# Patient Record
Sex: FEMALE | Race: WHITE | NOT HISPANIC OR LATINO | Employment: OTHER | URBAN - METROPOLITAN AREA
[De-identification: names, ages, dates, MRNs, and addresses within clinical notes are randomized per-mention and may not be internally consistent; named-entity substitution may affect disease eponyms.]

---

## 2023-06-14 ENCOUNTER — TELEPHONE (OUTPATIENT)
Dept: PRIMARY CARE | Facility: CLINIC | Age: 78
End: 2023-06-14
Payer: MEDICARE

## 2023-06-14 NOTE — TELEPHONE ENCOUNTER
Patient calling for lab orders for up coming appt on 6/19/23 for [facility]. Patient states that they are in an assisted living that only takes them Tuesday and Thursday for appts. Patient wants to go tomorrow for the lab work.    Thank you.

## 2023-06-15 ENCOUNTER — APPOINTMENT (OUTPATIENT)
Dept: LAB | Facility: LAB | Age: 78
End: 2023-06-15
Payer: MEDICARE

## 2023-06-15 PROBLEM — R06.83 SNORING: Status: ACTIVE | Noted: 2023-06-15

## 2023-06-15 PROBLEM — E55.9 VITAMIN D DEFICIENCY: Status: ACTIVE | Noted: 2023-06-15

## 2023-06-15 PROBLEM — I10 HTN (HYPERTENSION): Status: ACTIVE | Noted: 2023-06-15

## 2023-06-15 PROBLEM — R53.83 FATIGUE: Status: ACTIVE | Noted: 2023-06-15

## 2023-06-15 PROBLEM — F22 DELUSIONAL THOUGHTS (MULTI): Status: ACTIVE | Noted: 2023-06-15

## 2023-06-15 PROBLEM — E78.5 HYPERLIPIDEMIA: Status: ACTIVE | Noted: 2023-06-15

## 2023-06-15 PROBLEM — F41.9 ANXIETY: Status: ACTIVE | Noted: 2023-06-15

## 2023-06-15 PROBLEM — M81.0 OSTEOPOROSIS, SENILE: Status: ACTIVE | Noted: 2023-06-15

## 2023-06-15 PROBLEM — F01.50 VASCULAR DEMENTIA WITHOUT BEHAVIORAL DISTURBANCE (MULTI): Status: ACTIVE | Noted: 2023-06-15

## 2023-06-15 PROBLEM — R60.0 LOWER EXTREMITY EDEMA: Status: ACTIVE | Noted: 2023-06-15

## 2023-06-15 PROBLEM — F03.90 DEMENTIA (MULTI): Status: ACTIVE | Noted: 2023-06-15

## 2023-06-15 LAB
ALANINE AMINOTRANSFERASE (SGPT) (U/L) IN SER/PLAS: 10 U/L (ref 7–45)
ALBUMIN (G/DL) IN SER/PLAS: 3.9 G/DL (ref 3.4–5)
ALKALINE PHOSPHATASE (U/L) IN SER/PLAS: 69 U/L (ref 33–136)
ANION GAP IN SER/PLAS: 10 MMOL/L (ref 10–20)
APPEARANCE, URINE: NORMAL
ASPARTATE AMINOTRANSFERASE (SGOT) (U/L) IN SER/PLAS: 15 U/L (ref 9–39)
BILIRUBIN TOTAL (MG/DL) IN SER/PLAS: 0.6 MG/DL (ref 0–1.2)
BILIRUBIN, URINE: NEGATIVE
BLOOD, URINE: NEGATIVE
CALCIDIOL (25 OH VITAMIN D3) (NG/ML) IN SER/PLAS: 38 NG/ML
CALCIUM (MG/DL) IN SER/PLAS: 9 MG/DL (ref 8.6–10.3)
CARBON DIOXIDE, TOTAL (MMOL/L) IN SER/PLAS: 27 MMOL/L (ref 21–32)
CHLORIDE (MMOL/L) IN SER/PLAS: 108 MMOL/L (ref 98–107)
CHOLESTEROL (MG/DL) IN SER/PLAS: 213 MG/DL (ref 0–199)
CHOLESTEROL IN HDL (MG/DL) IN SER/PLAS: 58.3 MG/DL
CHOLESTEROL/HDL RATIO: 3.7
COLOR, URINE: NORMAL
CREATININE (MG/DL) IN SER/PLAS: 0.72 MG/DL (ref 0.5–1.05)
ERYTHROCYTE DISTRIBUTION WIDTH (RATIO) BY AUTOMATED COUNT: 13.9 % (ref 11.5–14.5)
ERYTHROCYTE MEAN CORPUSCULAR HEMOGLOBIN CONCENTRATION (G/DL) BY AUTOMATED: 32.1 G/DL (ref 32–36)
ERYTHROCYTE MEAN CORPUSCULAR VOLUME (FL) BY AUTOMATED COUNT: 95 FL (ref 80–100)
ERYTHROCYTES (10*6/UL) IN BLOOD BY AUTOMATED COUNT: 4.6 X10E12/L (ref 4–5.2)
GFR FEMALE: 85 ML/MIN/1.73M2
GLUCOSE (MG/DL) IN SER/PLAS: 93 MG/DL (ref 74–99)
GLUCOSE, URINE: NEGATIVE MG/DL
HEMATOCRIT (%) IN BLOOD BY AUTOMATED COUNT: 43.9 % (ref 36–46)
HEMOGLOBIN (G/DL) IN BLOOD: 14.1 G/DL (ref 12–16)
KETONES, URINE: NEGATIVE MG/DL
LDL: 145 MG/DL (ref 0–99)
LEUKOCYTE ESTERASE, URINE: NEGATIVE
LEUKOCYTES (10*3/UL) IN BLOOD BY AUTOMATED COUNT: 6.6 X10E9/L (ref 4.4–11.3)
NITRITE, URINE: NEGATIVE
PH, URINE: 5 (ref 5–8)
PLATELETS (10*3/UL) IN BLOOD AUTOMATED COUNT: 263 X10E9/L (ref 150–450)
POTASSIUM (MMOL/L) IN SER/PLAS: 4.2 MMOL/L (ref 3.5–5.3)
PROTEIN TOTAL: 6.9 G/DL (ref 6.4–8.2)
PROTEIN, URINE: NEGATIVE MG/DL
SODIUM (MMOL/L) IN SER/PLAS: 141 MMOL/L (ref 136–145)
SPECIFIC GRAVITY, URINE: 1.02 (ref 1–1.03)
THYROTROPIN (MIU/L) IN SER/PLAS BY DETECTION LIMIT <= 0.05 MIU/L: 2.9 MIU/L (ref 0.44–3.98)
TRIGLYCERIDE (MG/DL) IN SER/PLAS: 51 MG/DL (ref 0–149)
UREA NITROGEN (MG/DL) IN SER/PLAS: 18 MG/DL (ref 6–23)
UROBILINOGEN, URINE: <2 MG/DL (ref 0–1.9)
VLDL: 10 MG/DL (ref 0–40)

## 2023-06-15 RX ORDER — ACETAMINOPHEN 500 MG
TABLET ORAL
COMMUNITY
Start: 2014-09-08

## 2023-06-15 RX ORDER — ARIPIPRAZOLE 2 MG/1
1 TABLET ORAL DAILY
COMMUNITY
Start: 2021-02-22 | End: 2023-06-19 | Stop reason: SDUPTHER

## 2023-06-15 RX ORDER — ACETAMINOPHEN 500 MG
1 TABLET ORAL DAILY
COMMUNITY
Start: 2021-02-22

## 2023-06-15 RX ORDER — CITALOPRAM 10 MG/1
1 TABLET ORAL DAILY
COMMUNITY
Start: 2021-02-22 | End: 2023-06-19 | Stop reason: SDUPTHER

## 2023-06-19 ENCOUNTER — LAB (OUTPATIENT)
Dept: LAB | Facility: LAB | Age: 78
End: 2023-06-19
Payer: MEDICARE

## 2023-06-19 ENCOUNTER — OFFICE VISIT (OUTPATIENT)
Dept: PRIMARY CARE | Facility: CLINIC | Age: 78
End: 2023-06-19
Payer: MEDICARE

## 2023-06-19 VITALS
BODY MASS INDEX: 35.82 KG/M2 | SYSTOLIC BLOOD PRESSURE: 131 MMHG | DIASTOLIC BLOOD PRESSURE: 68 MMHG | WEIGHT: 215 LBS | HEART RATE: 58 BPM | OXYGEN SATURATION: 96 % | HEIGHT: 65 IN

## 2023-06-19 DIAGNOSIS — R60.0 LOWER EXTREMITY EDEMA: ICD-10-CM

## 2023-06-19 DIAGNOSIS — F22 DELUSIONAL THOUGHTS (MULTI): ICD-10-CM

## 2023-06-19 DIAGNOSIS — F32.A DEPRESSION, UNSPECIFIED DEPRESSION TYPE: ICD-10-CM

## 2023-06-19 DIAGNOSIS — M81.0 OSTEOPOROSIS, SENILE: ICD-10-CM

## 2023-06-19 DIAGNOSIS — E55.9 VITAMIN D DEFICIENCY: ICD-10-CM

## 2023-06-19 DIAGNOSIS — F01.50 VASCULAR DEMENTIA WITHOUT BEHAVIORAL DISTURBANCE (MULTI): ICD-10-CM

## 2023-06-19 DIAGNOSIS — Z00.00 MEDICARE ANNUAL WELLNESS VISIT, SUBSEQUENT: ICD-10-CM

## 2023-06-19 DIAGNOSIS — Z86.73 HISTORY OF STROKE: ICD-10-CM

## 2023-06-19 DIAGNOSIS — F01.50 VASCULAR DEMENTIA WITHOUT BEHAVIORAL DISTURBANCE (MULTI): Primary | ICD-10-CM

## 2023-06-19 PROBLEM — I10 HTN (HYPERTENSION): Status: RESOLVED | Noted: 2023-06-15 | Resolved: 2023-06-19

## 2023-06-19 LAB
ALANINE AMINOTRANSFERASE (SGPT) (U/L) IN SER/PLAS: 10 U/L (ref 7–45)
ALBUMIN (G/DL) IN SER/PLAS: 3.9 G/DL (ref 3.4–5)
ALKALINE PHOSPHATASE (U/L) IN SER/PLAS: 75 U/L (ref 33–136)
ANION GAP IN SER/PLAS: 11 MMOL/L (ref 10–20)
ASPARTATE AMINOTRANSFERASE (SGOT) (U/L) IN SER/PLAS: 16 U/L (ref 9–39)
BILIRUBIN TOTAL (MG/DL) IN SER/PLAS: 0.5 MG/DL (ref 0–1.2)
CALCIUM (MG/DL) IN SER/PLAS: 8.9 MG/DL (ref 8.6–10.3)
CARBON DIOXIDE, TOTAL (MMOL/L) IN SER/PLAS: 27 MMOL/L (ref 21–32)
CHLORIDE (MMOL/L) IN SER/PLAS: 107 MMOL/L (ref 98–107)
CREATININE (MG/DL) IN SER/PLAS: 0.73 MG/DL (ref 0.5–1.05)
GFR FEMALE: 84 ML/MIN/1.73M2
GLUCOSE (MG/DL) IN SER/PLAS: 100 MG/DL (ref 74–99)
POTASSIUM (MMOL/L) IN SER/PLAS: 4.3 MMOL/L (ref 3.5–5.3)
PROTEIN TOTAL: 6.7 G/DL (ref 6.4–8.2)
SODIUM (MMOL/L) IN SER/PLAS: 141 MMOL/L (ref 136–145)
UREA NITROGEN (MG/DL) IN SER/PLAS: 20 MG/DL (ref 6–23)

## 2023-06-19 PROCEDURE — 80053 COMPREHEN METABOLIC PANEL: CPT

## 2023-06-19 PROCEDURE — G0446 INTENS BEHAVE THER CARDIO DX: HCPCS | Performed by: FAMILY MEDICINE

## 2023-06-19 PROCEDURE — 99497 ADVNCD CARE PLAN 30 MIN: CPT | Performed by: FAMILY MEDICINE

## 2023-06-19 PROCEDURE — G0439 PPPS, SUBSEQ VISIT: HCPCS | Performed by: FAMILY MEDICINE

## 2023-06-19 PROCEDURE — 1036F TOBACCO NON-USER: CPT | Performed by: FAMILY MEDICINE

## 2023-06-19 PROCEDURE — 1157F ADVNC CARE PLAN IN RCRD: CPT | Performed by: FAMILY MEDICINE

## 2023-06-19 PROCEDURE — 36415 COLL VENOUS BLD VENIPUNCTURE: CPT

## 2023-06-19 PROCEDURE — 1159F MED LIST DOCD IN RCRD: CPT | Performed by: FAMILY MEDICINE

## 2023-06-19 PROCEDURE — 99214 OFFICE O/P EST MOD 30 MIN: CPT | Performed by: FAMILY MEDICINE

## 2023-06-19 PROCEDURE — 1170F FXNL STATUS ASSESSED: CPT | Performed by: FAMILY MEDICINE

## 2023-06-19 PROCEDURE — 1160F RVW MEDS BY RX/DR IN RCRD: CPT | Performed by: FAMILY MEDICINE

## 2023-06-19 RX ORDER — ARIPIPRAZOLE 2 MG/1
2 TABLET ORAL DAILY
Qty: 90 TABLET | Refills: 2 | Status: SHIPPED | OUTPATIENT
Start: 2023-06-19 | End: 2024-01-02 | Stop reason: SDUPTHER

## 2023-06-19 RX ORDER — CITALOPRAM 10 MG/1
10 TABLET ORAL DAILY
Qty: 90 TABLET | Refills: 2 | Status: SHIPPED | OUTPATIENT
Start: 2023-06-19 | End: 2024-01-02 | Stop reason: SDUPTHER

## 2023-06-19 ASSESSMENT — ACTIVITIES OF DAILY LIVING (ADL)
MANAGING_FINANCES: TOTAL CARE
GROCERY_SHOPPING: TOTAL CARE
TAKING_MEDICATION: NEEDS ASSISTANCE
DOING_HOUSEWORK: TOTAL CARE
BATHING: NEEDS ASSISTANCE
DRESSING: NEEDS ASSISTANCE

## 2023-06-19 ASSESSMENT — PATIENT HEALTH QUESTIONNAIRE - PHQ9
1. LITTLE INTEREST OR PLEASURE IN DOING THINGS: NOT AT ALL
2. FEELING DOWN, DEPRESSED OR HOPELESS: NOT AT ALL
SUM OF ALL RESPONSES TO PHQ9 QUESTIONS 1 & 2: 0

## 2023-06-19 NOTE — PROGRESS NOTES
"General Medical Management Note    78 y.o. female presents for Medical Management.  Niece present  HPI    Living at [facility].  Walks with walker.  No falls.  Meds administered by staff. Weakness with prolonged standing.  Unable to manage finances, cook, shop.  Sleeps in a recliner to avoid \"pain\".  Takes > 30 sec to stand from sitting    Chronic low extremity edema.  Never elevates legs.    Compliant with current medications.  Notes no side effects.  Long-term anticoagulation due to history of stroke.  She denies hematochezia, epistaxis and hematemesis.  Depression and anxiety are managed with Celexa.  Mood disorder managed with Abilify.    Mental status has somewhat improved since her stroke.  She is oriented x4 (person, place, time and situation) however has difficulty with insight and judgment.      Past Medical History:   Diagnosis Date    Acute embolism and thrombosis of other specified veins     Blood clot in abdominal vein    Nontraumatic intracerebral hemorrhage, unspecified (CMS/HCC) 02/13/2020    Hemorrhagic cerebrovascular accident (CVA)    Personal history of malignant neoplasm of other parts of uterus 02/22/2021    History of malignant neoplasm of endometrium    Personal history of other diseases of the circulatory system 02/22/2021    History of hypertension    Personal history of other diseases of the circulatory system 03/20/2017    History of cerebral hemorrhage    Personal history of other diseases of the circulatory system 12/22/2016    History of cerebral parenchymal hemorrhage    Personal history of other venous thrombosis and embolism     History of DVT of lower extremity    Personal history of other venous thrombosis and embolism 08/20/2017    History of deep vein thrombosis (DVT) of lower extremity    Personal history of pulmonary embolism 08/20/2017    History of pulmonary embolism    Personal history of pulmonary embolism 02/22/2021    History of pulmonary embolism      Past " Surgical History:   Procedure Laterality Date    CT HEAD ANGIO W AND WO IV CONTRAST  8/9/2016    CT HEAD ANGIO W AND WO IV CONTRAST 8/9/[facility]    CT NECK ANGIO W AND WO IV CONTRAST  8/9/2016    CT NECK ANGIO W AND WO IV CONTRAST 8/9/2016 [facility]    OTHER SURGICAL HISTORY  02/22/2021    Total hysterectomy with removal of both tubes and ovaries    OTHER SURGICAL HISTORY  08/27/2019    Cyst excision    OTHER SURGICAL HISTORY  08/28/2019    Cyst excision     Family History   Problem Relation Name Age of Onset    Aneurysm Mother      Other (malignant neoplasm of stomach [Other]) Father      Other (malignant neoplasm of breast) Sister        Social History     Socioeconomic History    Marital status: Single     Spouse name: Not on file    Number of children: Not on file    Years of education: Not on file    Highest education level: Not on file   Occupational History    Not on file   Tobacco Use    Smoking status: Not on file    Smokeless tobacco: Not on file   Substance and Sexual Activity    Alcohol use: Not on file    Drug use: Not on file    Sexual activity: Not on file   Other Topics Concern    Not on file   Social History Narrative    Not on file     Social Determinants of Health     Financial Resource Strain: Not on file   Food Insecurity: Not on file   Transportation Needs: Not on file   Physical Activity: Not on file   Stress: Not on file   Social Connections: Not on file   Intimate Partner Violence: Not on file   Housing Stability: Not on file       Current Outpatient Medications on File Prior to Visit   Medication Sig Dispense Refill    acetaminophen (Tylenol) 500 mg tablet Take by mouth.      ARIPiprazole (Abilify) 2 mg tablet Take 1 tablet (2 mg) by mouth once daily.      cholecalciferol (Vitamin D-3) 5,000 Units tablet Take 1 tablet (5,000 Units) by mouth once daily.      citalopram (CeleXA) 10 mg tablet Take 1 tablet (10 mg) by mouth once daily.      psyllium (Metamucil) 3.4  "gram packet Take by mouth.      rivaroxaban (Xarelto) 20 mg tablet Take by mouth.       No current facility-administered medications on file prior to visit.       Allergies   Allergen Reactions    Oxycodone Unknown    Propoxyphene Hallucinations    Pseudoephedrine Hallucinations    Saccharin Unknown         ROS: Denies chest pain, SOB, Headache, GI problems     Visit Vitals  /68   Pulse 58   Ht 1.651 m (5' 5\")   Wt 97.5 kg (215 lb)   SpO2 96%   BMI 35.78 kg/m²   Smoking Status Never Assessed   BSA 2.11 m²        PHYSICAL EXAM:  Alert and oriented x3.  Eyes: EOM grossly intact.  Face expressionless.  No tremors noted.  Neck supple without lymph adenopathy or carotid bruit.  No masses or thyromegaly  Heart regular rate and rhythm without murmur.  Lungs clear to auscultation.  Legs without edema.  Gait is impaired due to stroke but patient is ambulatory with a rollator.   Speech clear.  Hearing adequate.      The 10-year ASCVD risk score (Letitia DK, et al., 2019) is: 22.8%    Values used to calculate the score:      Age: 78 years      Sex: Female      Is Non- : No      Diabetic: No      Tobacco smoker: No      Systolic Blood Pressure: 131 mmHg      Is BP treated: No      HDL Cholesterol: 58.3 mg/dL      Total Cholesterol: 213 mg/dL      DIAGNOSIS/PLAN:  1. Vascular dementia without behavioral disturbance (CMS/HCC)  Continue Xarelto 20mg daily    - Comprehensive Metabolic Panel; Future    2. Depression, unspecified depression type  - Comprehensive Metabolic Panel; Future  - citalopram (CeleXA) 10 mg tablet; Take 1 tablet (10 mg) by mouth once daily.  Dispense: 90 tablet; Refill: 2    3. Lower extremity edema  Notify me if worsening.  Recommend elevation of legs at least at night.  Patient cannot sleep in a bed.  She can only elevate legs as much as her recliner allows    4. Osteoporosis, senile  -Continue calcium and vitamin D supplement daily.  Continue ambulation as much as " possible.    5. Vitamin D deficiency  Continue daily supplement    6. Delusional thoughts (CMS/[facility])  - Comprehensive Metabolic Panel; Future  - ARIPiprazole (Abilify) 2 mg tablet; Take 1 tablet (2 mg) by mouth once daily.  Dispense: 90 tablet; Refill: 2    7. History of stroke  - rivaroxaban (Xarelto) 20 mg tablet; Take 1 tablet (20 mg) by mouth once daily in the evening. Take with meals.  Dispense: 90 tablet; Refill: 2    8. Medicare annual wellness visit, subsequent  Living Will / Advanced Care Planning:  Discussed advance care planning including explanation and discussion of advanced directives.  Patient does have current up-to-date documents.          Return to office in 6 months for comprehensive medical evaluation, long-term medication use monitoring, and preventative services screening    Will continue to monitor, evaluate, assess and treat all problems/diagnoses as appropriate and continue to collaborate with specialists.    Encouraged to sign up with  [employer]    Contact office or send a  [employer] message with any questions or concerns    Patient will only be notified of labs that require medical intervention.    Prescriptions will not be filled unless you are compliant with your follow up appointments or have a follow up appointment scheduled as per instruction of your physician. Refills should be requested at the time of your visit.    **Charting was completed using voice recognition technology and may include unintended errors**    Jay Fernandes DO, FACOFP  Senior Attending Physician  [facility]  [address]  [place] [address] [phone]           Jay Fernandes DO, FACOFP     Subjective   Reason for Visit: Amy Nava is an 78 y.o. female here for a Medicare Wellness visit.               HPI    Patient Care Team:  Jay Fernandes DO as PCP - General  Jay Fernandes DO as PCP - [facility] Medicare Advantage PCP     Review of Systems    Objective   Vitals:  BP  "131/68   Pulse 58   Ht 1.651 m (5' 5\")   Wt 97.5 kg (215 lb)   SpO2 96%   BMI 35.78 kg/m²       Physical Exam    Assessment/Plan   Problem List Items Addressed This Visit       Fatigue     Other Visit Diagnoses       Routine general medical examination at health care facility    -  Primary               "

## 2023-12-19 ENCOUNTER — LAB (OUTPATIENT)
Dept: LAB | Facility: LAB | Age: 78
End: 2023-12-19
Payer: MEDICARE

## 2023-12-19 ENCOUNTER — OFFICE VISIT (OUTPATIENT)
Dept: PRIMARY CARE | Facility: CLINIC | Age: 78
End: 2023-12-19
Payer: MEDICARE

## 2023-12-19 VITALS
DIASTOLIC BLOOD PRESSURE: 74 MMHG | WEIGHT: 220 LBS | HEIGHT: 63 IN | SYSTOLIC BLOOD PRESSURE: 141 MMHG | HEART RATE: 90 BPM | OXYGEN SATURATION: 96 % | BODY MASS INDEX: 38.98 KG/M2

## 2023-12-19 DIAGNOSIS — E78.5 HYPERLIPIDEMIA, UNSPECIFIED HYPERLIPIDEMIA TYPE: ICD-10-CM

## 2023-12-19 DIAGNOSIS — Z86.73 HISTORY OF STROKE: ICD-10-CM

## 2023-12-19 DIAGNOSIS — R60.0 LOWER EXTREMITY EDEMA: ICD-10-CM

## 2023-12-19 DIAGNOSIS — F32.A DEPRESSION, UNSPECIFIED DEPRESSION TYPE: ICD-10-CM

## 2023-12-19 DIAGNOSIS — E66.01 CLASS 2 SEVERE OBESITY DUE TO EXCESS CALORIES WITH SERIOUS COMORBIDITY AND BODY MASS INDEX (BMI) OF 38.0 TO 38.9 IN ADULT (MULTI): ICD-10-CM

## 2023-12-19 DIAGNOSIS — F01.50 VASCULAR DEMENTIA WITHOUT BEHAVIORAL DISTURBANCE (MULTI): Primary | ICD-10-CM

## 2023-12-19 PROBLEM — E66.812 CLASS 2 SEVERE OBESITY DUE TO EXCESS CALORIES WITH SERIOUS COMORBIDITY AND BODY MASS INDEX (BMI) OF 38.0 TO 38.9 IN ADULT: Status: ACTIVE | Noted: 2023-12-19

## 2023-12-19 LAB
ALBUMIN SERPL BCP-MCNC: 3.9 G/DL (ref 3.4–5)
ALP SERPL-CCNC: 72 U/L (ref 33–136)
ALT SERPL W P-5'-P-CCNC: 9 U/L (ref 7–45)
ANION GAP SERPL CALC-SCNC: 8 MMOL/L (ref 10–20)
AST SERPL W P-5'-P-CCNC: 12 U/L (ref 9–39)
BILIRUB SERPL-MCNC: 0.5 MG/DL (ref 0–1.2)
BUN SERPL-MCNC: 21 MG/DL (ref 6–23)
CALCIUM SERPL-MCNC: 9 MG/DL (ref 8.6–10.3)
CHLORIDE SERPL-SCNC: 108 MMOL/L (ref 98–107)
CO2 SERPL-SCNC: 30 MMOL/L (ref 21–32)
CREAT SERPL-MCNC: 0.79 MG/DL (ref 0.5–1.05)
GFR SERPL CREATININE-BSD FRML MDRD: 77 ML/MIN/1.73M*2
GLUCOSE SERPL-MCNC: 100 MG/DL (ref 74–99)
POTASSIUM SERPL-SCNC: 4.4 MMOL/L (ref 3.5–5.3)
PROT SERPL-MCNC: 6.5 G/DL (ref 6.4–8.2)
SODIUM SERPL-SCNC: 142 MMOL/L (ref 136–145)

## 2023-12-19 PROCEDURE — 80053 COMPREHEN METABOLIC PANEL: CPT

## 2023-12-19 PROCEDURE — 1126F AMNT PAIN NOTED NONE PRSNT: CPT | Performed by: FAMILY MEDICINE

## 2023-12-19 PROCEDURE — 1159F MED LIST DOCD IN RCRD: CPT | Performed by: FAMILY MEDICINE

## 2023-12-19 PROCEDURE — 1160F RVW MEDS BY RX/DR IN RCRD: CPT | Performed by: FAMILY MEDICINE

## 2023-12-19 PROCEDURE — 36415 COLL VENOUS BLD VENIPUNCTURE: CPT

## 2023-12-19 PROCEDURE — 1036F TOBACCO NON-USER: CPT | Performed by: FAMILY MEDICINE

## 2023-12-19 PROCEDURE — 99214 OFFICE O/P EST MOD 30 MIN: CPT | Performed by: FAMILY MEDICINE

## 2023-12-19 NOTE — PROGRESS NOTES
General Medical Management Note    78 y.o. female presents for Medical Management.  Presents with her daughter who provides most of the history.  HPI    Not exercising much;  requiring a lift chair to stand.  Walking short distances outside of ALU room with walker.  Walks within room without walker.      HLD - long history of elevated LDL but patient is not willing to take medication to control it.    Weight management: Patient has lost 12 pounds since June 2022    Depression and dementia are managed with Celexa and Abilify.  Patient apparently remains appropriate for assisted living.  No behavioral concerns by the daughter.    Past Medical History:   Diagnosis Date    Acute embolism and thrombosis of other specified veins     Blood clot in abdominal vein    HTN (hypertension) 06/15/2023    Nontraumatic intracerebral hemorrhage, unspecified (CMS/HCC) 02/13/2020    Hemorrhagic cerebrovascular accident (CVA)    Personal history of malignant neoplasm of other parts of uterus 02/22/2021    History of malignant neoplasm of endometrium    Personal history of other diseases of the circulatory system 02/22/2021    History of hypertension    Personal history of other diseases of the circulatory system 03/20/2017    History of cerebral hemorrhage    Personal history of other diseases of the circulatory system 12/22/2016    History of cerebral parenchymal hemorrhage    Personal history of other venous thrombosis and embolism     History of DVT of lower extremity    Personal history of other venous thrombosis and embolism 08/20/2017    History of deep vein thrombosis (DVT) of lower extremity    Personal history of pulmonary embolism 08/20/2017    History of pulmonary embolism    Personal history of pulmonary embolism 02/22/2021    History of pulmonary embolism      Past Surgical History:   Procedure Laterality Date    CT ANGIO NECK W AND WO IV CONTRAST  8/9/2016    CT NECK ANGIO W AND WO IV CONTRAST 8/9/2016 [facility]PATIENT  [facility]GAC[facility]    CT HEAD ANGIO W AND WO IV CONTRAST  8/9/2016    CT HEAD ANGIO W AND WO IV CONTRAST 8/9/2016 [facility] INPATIENT LEGAC[facility]    OTHER SURGICAL HISTORY  02/22/2021    Total hysterectomy with removal of both tubes and ovaries    OTHER SURGICAL HISTORY  08/27/2019    Cyst excision    OTHER SURGICAL HISTORY  08/28/2019    Cyst excision     Family History   Problem Relation Name Age of Onset    Aneurysm Mother      Other (malignant neoplasm of stomach [Other]) Father      Other (malignant neoplasm of breast) Sister        Social History     Socioeconomic History    Marital status: Single     Spouse name: Not on file    Number of children: Not on file    Years of education: Not on file    Highest education level: Not on file   Occupational History    Not on file   Tobacco Use    Smoking status: Never    Smokeless tobacco: Never   Substance and Sexual Activity    Alcohol use: Not Currently    Drug use: Not Currently    Sexual activity: Not on file   Other Topics Concern    Not on file   Social History Narrative    Not on file     Social Determinants of Health     Financial Resource Strain: Not on file   Food Insecurity: Not on file   Transportation Needs: Not on file   Physical Activity: Not on file   Stress: Not on file   Social Connections: Not on file   Intimate Partner Violence: Not on file   Housing Stability: Not on file       Current Outpatient Medications on File Prior to Visit   Medication Sig Dispense Refill    acetaminophen (Tylenol) 500 mg tablet Take by mouth.      ARIPiprazole (Abilify) 2 mg tablet Take 1 tablet (2 mg) by mouth once daily. 90 tablet 2    cholecalciferol (Vitamin D-3) 5,000 Units tablet Take 1 tablet (5,000 Units) by mouth once daily.      citalopram (CeleXA) 10 mg tablet Take 1 tablet (10 mg) by mouth once daily. 90 tablet 2    psyllium (Metamucil) 3.4 gram packet Take by mouth.      rivaroxaban (Xarelto) 20 mg tablet Take 1 tablet (20 mg) by mouth once daily in the evening. Take with meals. 90  "tablet 2     No current facility-administered medications on file prior to visit.       Allergies   Allergen Reactions    Oxycodone Unknown    Propoxyphene Hallucinations    Pseudoephedrine Hallucinations    Saccharin Unknown         ROS: Denies chest pain, SOB, Headache, GI problems     Visit Vitals  /74   Pulse 90   Ht 1.6 m (5' 3\")   Wt 99.8 kg (220 lb)   SpO2 96%   BMI 38.97 kg/m²   Smoking Status Never   BSA 2.11 m²        PHYSICAL EXAM:  Alert and oriented x3.  Eyes: EOM grossly intact  Neck supple without lymph adenopathy or carotid bruit.  No masses or thyromegaly  Heart regular rate and rhythm without murmur.  Lungs clear to auscultation.  Legs without significant edema.  Gait is slow and steady with a walker.  Speech clear.  Hearing adequate.          DIAGNOSIS/PLAN:  1. Lower extremity edema  - Comprehensive Metabolic Panel; Future    2. Hyperlipidemia, unspecified hyperlipidemia type  Discussed increased risk for heart disease and stroke with elevated LDL cholesterol.  Patient is not willing to take medication    3. Vascular dementia without behavioral disturbance (CMS/[facility])  Late effects of stroke.    4. History of stroke  Continue Xarelto.  Prescriptions will be sent to [employer] [facility] mail order after January 1, 2024    5. Depression, unspecified depression type  Continue Abilify and Celexa.  Prescriptions will be sent to [employer] [facility] mail order after January 1, 2024    6.  Obesity with BMI 38.9  Patient encouraged to commit to a diet of lower carbohydrates and increase vegetable and fruit intake. Patient also encouraged to increase water intake to 80 ounces/day. Continue exercise for at least 30 minutes a day on most days of the week.  Sustained obesity leads to increased risk for multiple medical problems including heart attack, stroke, cancer and infection.  For more assistance and weight loss options, go to the website: Yourweightmatters.org.  Additional resources:  RethinkObesity.com, obesity.org, " obesityaction.org, [employer].com        Return to office in 6 months for comprehensive medical evaluation, long-term medication use monitoring, and preventative services screening    We will continue to monitor, evaluate, assess and treat all problems/diagnoses as appropriate and continue to collaborate with specialists.    [facility] with [facility]    Contact office or send a  [employer] message with any questions or concerns    Patient will only be notified of labs that require medical intervention.    Prescriptions will not be filled unless you are compliant with your follow up appointments or have a follow up appointment scheduled as per instruction of your physician. Refills should be requested at the time of your visit.    **Charting was completed using voice recognition technology and may include unintended errors**    Jay Fernandes DO, JORGE  [id number]  [place] [address] [phone]  Jay Fernandes DO, JORGE

## 2024-01-02 DIAGNOSIS — F22 DELUSIONAL THOUGHTS (MULTI): ICD-10-CM

## 2024-01-02 DIAGNOSIS — F32.A DEPRESSION, UNSPECIFIED DEPRESSION TYPE: ICD-10-CM

## 2024-01-02 DIAGNOSIS — Z86.73 HISTORY OF STROKE: ICD-10-CM

## 2024-01-02 RX ORDER — ARIPIPRAZOLE 2 MG/1
2 TABLET ORAL DAILY
Qty: 90 TABLET | Refills: 2 | Status: SHIPPED | OUTPATIENT
Start: 2024-01-02

## 2024-01-02 RX ORDER — CITALOPRAM 10 MG/1
10 TABLET ORAL DAILY
Qty: 90 TABLET | Refills: 2 | Status: SHIPPED | OUTPATIENT
Start: 2024-01-02

## 2024-06-28 ENCOUNTER — APPOINTMENT (OUTPATIENT)
Dept: PRIMARY CARE | Facility: CLINIC | Age: 79
End: 2024-06-28
Payer: MEDICARE

## 2024-07-02 ENCOUNTER — APPOINTMENT (OUTPATIENT)
Dept: PRIMARY CARE | Facility: CLINIC | Age: 79
End: 2024-07-02
Payer: MEDICARE

## 2024-07-02 ENCOUNTER — LAB (OUTPATIENT)
Dept: LAB | Facility: LAB | Age: 79
End: 2024-07-02
Payer: MEDICARE

## 2024-07-02 VITALS
WEIGHT: 206 LBS | SYSTOLIC BLOOD PRESSURE: 108 MMHG | BODY MASS INDEX: 36.5 KG/M2 | OXYGEN SATURATION: 97 % | DIASTOLIC BLOOD PRESSURE: 66 MMHG | HEIGHT: 63 IN | HEART RATE: 92 BPM

## 2024-07-02 DIAGNOSIS — I10 ESSENTIAL (PRIMARY) HYPERTENSION: ICD-10-CM

## 2024-07-02 DIAGNOSIS — M81.0 OSTEOPOROSIS, SENILE: ICD-10-CM

## 2024-07-02 DIAGNOSIS — E78.5 HYPERLIPIDEMIA, UNSPECIFIED HYPERLIPIDEMIA TYPE: ICD-10-CM

## 2024-07-02 DIAGNOSIS — Z86.73 HISTORY OF STROKE: ICD-10-CM

## 2024-07-02 DIAGNOSIS — Z00.00 MEDICARE ANNUAL WELLNESS VISIT, SUBSEQUENT: ICD-10-CM

## 2024-07-02 DIAGNOSIS — R30.0 DYSURIA: ICD-10-CM

## 2024-07-02 DIAGNOSIS — F01.50 VASCULAR DEMENTIA WITHOUT BEHAVIORAL DISTURBANCE (MULTI): ICD-10-CM

## 2024-07-02 DIAGNOSIS — C54.1 MALIGNANT NEOPLASM OF ENDOMETRIUM (MULTI): ICD-10-CM

## 2024-07-02 DIAGNOSIS — E66.01 CLASS 2 SEVERE OBESITY DUE TO EXCESS CALORIES WITH SERIOUS COMORBIDITY AND BODY MASS INDEX (BMI) OF 38.0 TO 38.9 IN ADULT (MULTI): ICD-10-CM

## 2024-07-02 DIAGNOSIS — F32.A DEPRESSION, UNSPECIFIED DEPRESSION TYPE: ICD-10-CM

## 2024-07-02 DIAGNOSIS — F22 DELUSIONAL THOUGHTS (MULTI): ICD-10-CM

## 2024-07-02 DIAGNOSIS — E55.9 VITAMIN D DEFICIENCY: ICD-10-CM

## 2024-07-02 DIAGNOSIS — Z00.00 HEALTH MAINTENANCE EXAMINATION: Primary | ICD-10-CM

## 2024-07-02 DIAGNOSIS — E66.01 CLASS 2 SEVERE OBESITY DUE TO EXCESS CALORIES WITH SERIOUS COMORBIDITY AND BODY MASS INDEX (BMI) OF 36.0 TO 36.9 IN ADULT (MULTI): ICD-10-CM

## 2024-07-02 DIAGNOSIS — R06.02 SHORTNESS OF BREATH: ICD-10-CM

## 2024-07-02 DIAGNOSIS — R32 URINARY INCONTINENCE, UNSPECIFIED TYPE: ICD-10-CM

## 2024-07-02 PROBLEM — I61.9 NONTRAUMATIC INTRACEREBRAL HEMORRHAGE, UNSPECIFIED (MULTI): Status: RESOLVED | Noted: 2024-07-02 | Resolved: 2024-07-02

## 2024-07-02 PROBLEM — Z86.718 PERSONAL HISTORY OF OTHER VENOUS THROMBOSIS AND EMBOLISM: Status: RESOLVED | Noted: 2024-07-02 | Resolved: 2024-07-02

## 2024-07-02 LAB
25(OH)D3 SERPL-MCNC: 34 NG/ML (ref 30–100)
ALBUMIN SERPL BCP-MCNC: 3.8 G/DL (ref 3.4–5)
ALP SERPL-CCNC: 71 U/L (ref 33–136)
ALT SERPL W P-5'-P-CCNC: 8 U/L (ref 7–45)
ANION GAP SERPL CALC-SCNC: 11 MMOL/L (ref 10–20)
APPEARANCE UR: CLEAR
AST SERPL W P-5'-P-CCNC: 13 U/L (ref 9–39)
BILIRUB SERPL-MCNC: 0.8 MG/DL (ref 0–1.2)
BILIRUB UR STRIP.AUTO-MCNC: NEGATIVE MG/DL
BUN SERPL-MCNC: 16 MG/DL (ref 6–23)
CALCIUM SERPL-MCNC: 9 MG/DL (ref 8.6–10.3)
CHLORIDE SERPL-SCNC: 107 MMOL/L (ref 98–107)
CHOLEST SERPL-MCNC: 212 MG/DL (ref 0–199)
CHOLESTEROL/HDL RATIO: 3.4
CO2 SERPL-SCNC: 28 MMOL/L (ref 21–32)
COLOR UR: ABNORMAL
CREAT SERPL-MCNC: 0.75 MG/DL (ref 0.5–1.05)
EGFRCR SERPLBLD CKD-EPI 2021: 81 ML/MIN/1.73M*2
ERYTHROCYTE [DISTWIDTH] IN BLOOD BY AUTOMATED COUNT: 13.8 % (ref 11.5–14.5)
GLUCOSE SERPL-MCNC: 89 MG/DL (ref 74–99)
GLUCOSE UR STRIP.AUTO-MCNC: NORMAL MG/DL
HCT VFR BLD AUTO: 44.3 % (ref 36–46)
HDLC SERPL-MCNC: 62 MG/DL
HGB BLD-MCNC: 14.3 G/DL (ref 12–16)
KETONES UR STRIP.AUTO-MCNC: NEGATIVE MG/DL
LDLC SERPL CALC-MCNC: 138 MG/DL
LEUKOCYTE ESTERASE UR QL STRIP.AUTO: ABNORMAL
MCH RBC QN AUTO: 30.8 PG (ref 26–34)
MCHC RBC AUTO-ENTMCNC: 32.3 G/DL (ref 32–36)
MCV RBC AUTO: 95 FL (ref 80–100)
MUCOUS THREADS #/AREA URNS AUTO: NORMAL /LPF
NITRITE UR QL STRIP.AUTO: NEGATIVE
NON HDL CHOLESTEROL: 150 MG/DL (ref 0–149)
NRBC BLD-RTO: 0 /100 WBCS (ref 0–0)
PH UR STRIP.AUTO: 6.5 [PH]
PLATELET # BLD AUTO: 276 X10*3/UL (ref 150–450)
POTASSIUM SERPL-SCNC: 4.4 MMOL/L (ref 3.5–5.3)
PROT SERPL-MCNC: 6.2 G/DL (ref 6.4–8.2)
PROT UR STRIP.AUTO-MCNC: NEGATIVE MG/DL
RBC # BLD AUTO: 4.65 X10*6/UL (ref 4–5.2)
RBC # UR STRIP.AUTO: NEGATIVE /UL
RBC #/AREA URNS AUTO: NORMAL /HPF
SODIUM SERPL-SCNC: 142 MMOL/L (ref 136–145)
SP GR UR STRIP.AUTO: 1.01
SQUAMOUS #/AREA URNS AUTO: NORMAL /HPF
TRIGL SERPL-MCNC: 58 MG/DL (ref 0–149)
TSH SERPL-ACNC: 3.16 MIU/L (ref 0.44–3.98)
UROBILINOGEN UR STRIP.AUTO-MCNC: NORMAL MG/DL
VLDL: 12 MG/DL (ref 0–40)
WBC # BLD AUTO: 6.2 X10*3/UL (ref 4.4–11.3)
WBC #/AREA URNS AUTO: NORMAL /HPF

## 2024-07-02 PROCEDURE — 1159F MED LIST DOCD IN RCRD: CPT | Performed by: FAMILY MEDICINE

## 2024-07-02 PROCEDURE — 1170F FXNL STATUS ASSESSED: CPT | Performed by: FAMILY MEDICINE

## 2024-07-02 PROCEDURE — 1158F ADVNC CARE PLAN TLK DOCD: CPT | Performed by: FAMILY MEDICINE

## 2024-07-02 PROCEDURE — 99497 ADVNCD CARE PLAN 30 MIN: CPT | Performed by: FAMILY MEDICINE

## 2024-07-02 PROCEDURE — G0439 PPPS, SUBSEQ VISIT: HCPCS | Performed by: FAMILY MEDICINE

## 2024-07-02 PROCEDURE — 80061 LIPID PANEL: CPT

## 2024-07-02 PROCEDURE — 1157F ADVNC CARE PLAN IN RCRD: CPT | Performed by: FAMILY MEDICINE

## 2024-07-02 PROCEDURE — 36415 COLL VENOUS BLD VENIPUNCTURE: CPT

## 2024-07-02 PROCEDURE — 80053 COMPREHEN METABOLIC PANEL: CPT

## 2024-07-02 PROCEDURE — 84443 ASSAY THYROID STIM HORMONE: CPT

## 2024-07-02 PROCEDURE — 93000 ELECTROCARDIOGRAM COMPLETE: CPT | Performed by: FAMILY MEDICINE

## 2024-07-02 PROCEDURE — 1036F TOBACCO NON-USER: CPT | Performed by: FAMILY MEDICINE

## 2024-07-02 PROCEDURE — 99397 PER PM REEVAL EST PAT 65+ YR: CPT | Performed by: FAMILY MEDICINE

## 2024-07-02 PROCEDURE — 99213 OFFICE O/P EST LOW 20 MIN: CPT | Performed by: FAMILY MEDICINE

## 2024-07-02 PROCEDURE — 1160F RVW MEDS BY RX/DR IN RCRD: CPT | Performed by: FAMILY MEDICINE

## 2024-07-02 PROCEDURE — 85027 COMPLETE CBC AUTOMATED: CPT

## 2024-07-02 PROCEDURE — 81001 URINALYSIS AUTO W/SCOPE: CPT

## 2024-07-02 PROCEDURE — 1123F ACP DISCUSS/DSCN MKR DOCD: CPT | Performed by: FAMILY MEDICINE

## 2024-07-02 PROCEDURE — 82306 VITAMIN D 25 HYDROXY: CPT

## 2024-07-02 PROCEDURE — G0446 INTENS BEHAVE THER CARDIO DX: HCPCS | Performed by: FAMILY MEDICINE

## 2024-07-02 RX ORDER — CITALOPRAM 10 MG/1
10 TABLET ORAL DAILY
Qty: 90 TABLET | Refills: 2 | Status: SHIPPED | OUTPATIENT
Start: 2024-07-02

## 2024-07-02 RX ORDER — ARIPIPRAZOLE 2 MG/1
2 TABLET ORAL DAILY
Qty: 90 TABLET | Refills: 2 | Status: SHIPPED | OUTPATIENT
Start: 2024-07-02

## 2024-07-02 ASSESSMENT — ACTIVITIES OF DAILY LIVING (ADL)
BATHING: NEEDS ASSISTANCE
GROCERY_SHOPPING: NEEDS ASSISTANCE
TAKING_MEDICATION: NEEDS ASSISTANCE
MANAGING_FINANCES: NEEDS ASSISTANCE
DOING_HOUSEWORK: NEEDS ASSISTANCE
DRESSING: NEEDS ASSISTANCE

## 2024-07-02 ASSESSMENT — PATIENT HEALTH QUESTIONNAIRE - PHQ9
1. LITTLE INTEREST OR PLEASURE IN DOING THINGS: NOT AT ALL
2. FEELING DOWN, DEPRESSED OR HOPELESS: NOT AT ALL
SUM OF ALL RESPONSES TO PHQ9 QUESTIONS 1 AND 2: 0

## 2024-07-02 NOTE — PROGRESS NOTES
Annual Comprehensive Medical Exam and annual Medicare wellness visit    79 y.o. female presents for annual comprehensive medical evaluation and preventive services screening.  No recent hospitalizations, surgeries or significant injuries.  Brought to office by her friend Kavitha     History of Present Illness    Living at [facility]    Has lost weight - 20 lb down from 1/2023.  Walking more with a walker.      Depression and mood disorder controlled with current medication.    Vascular dementia:  remains at high level of care/assistance    H/o Endometrial CA.  H/o LISA.  No GI issues    H/o stroke - taking Xarelto    Incontinent of urine - uses Depends briefs.    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    Hemorrhagic cerebrovascular accident (CVA)    Personal history of malignant neoplasm of other parts of uterus 02/22/2021    History of malignant neoplasm of endometrium    Personal history of other diseases of the circulatory system 02/22/2021    History of hypertension    Personal history of other diseases of the circulatory system 03/20/2017    History of cerebral hemorrhage    Personal history of other diseases of the circulatory system 12/22/2016    History of cerebral parenchymal hemorrhage    Personal history of other venous thrombosis and embolism     History of DVT of lower extremity    Personal history of other venous thrombosis and embolism 08/20/2017    History of deep vein thrombosis (DVT) of lower extremity    Personal history of pulmonary embolism 08/20/2017    History of pulmonary embolism    Personal history of pulmonary embolism 02/22/2021    History of pulmonary embolism      Past Surgical History:   Procedure Laterality Date    CT ANGIO NECK W AND WO IV CONTRAST   8/9/2016    CT NECK ANGIO W AND WO IV CONTRAST 8/9/2016 [facility] INPATIENT LEGACY    CT HEAD ANGIO W AND WO IV CONTRAST  8/9/2016    CT HEAD ANGIO W AND WO IV CONTRAST 8/9/2016 [facility] INPATIENT LEGACY    OTHER SURGICAL HISTORY  02/22/2021    Total hysterectomy with removal of both tubes and ovaries    OTHER SURGICAL HISTORY  08/27/2019    Cyst excision    OTHER SURGICAL HISTORY  08/28/2019    Cyst excision     Family History   Problem Relation Name Age of Onset    Aneurysm Mother      Other (malignant neoplasm of stomach [Other]) Father      Other (malignant neoplasm of breast) Sister        Social History     Socioeconomic History    Marital status: Single     Spouse name: Not on file    Number of children: Not on file    Years of education: Not on file    Highest education level: Not on file   Occupational History    Not on file   Tobacco Use    Smoking status: Never    Smokeless tobacco: Never   Substance and Sexual Activity    Alcohol use: Not Currently    Drug use: Not Currently    Sexual activity: Not on file   Other Topics Concern    Not on file   Social History Narrative    Not on file     Social Determinants of Health     Financial Resource Strain: Not on file   Food Insecurity: Not on file   Transportation Needs: Not on file   Physical Activity: Not on file   Stress: Not on file   Social Connections: Not on file   Intimate Partner Violence: Not on file   Housing Stability: Not on file       Current Outpatient Medications on File Prior to Visit   Medication Sig Dispense Refill    acetaminophen (Tylenol) 500 mg tablet Take by mouth.      ARIPiprazole (Abilify) 2 mg tablet Take 1 tablet (2 mg) by mouth once daily. 90 tablet 2    cholecalciferol (Vitamin D-3) 5,000 Units tablet Take 1 tablet (5,000 Units) by mouth once daily.      citalopram (CeleXA) 10 mg tablet Take 1 tablet (10 mg) by mouth once daily. 90 tablet 2    psyllium (Metamucil) 3.4 gram packet Take by mouth.      rivaroxaban (Xarelto) 20 mg tablet Take 1  "tablet (20 mg) by mouth once daily in the evening. Take with meals. 90 tablet 2     No current facility-administered medications on file prior to visit.       Allergies   Allergen Reactions    Oxycodone Unknown    Propoxyphene Hallucinations    Pseudoephedrine Hallucinations    Saccharin Unknown       Complete review of systems is negative today except for that mentioned in the history of present illness.  In particular patient denies chest pain, shortness of breath, headaches and GI disturbances.      Visit Vitals  /66   Pulse 92   Ht 1.6 m (5' 3\")   Wt 93.4 kg (206 lb)   SpO2 97%   BMI 36.49 kg/m²   Smoking Status Never   BSA 2.04 m²      Physical Exam  Gen.: Alert and oriented ×3 female in no acute distress.  HEENT: Head is normocephalic.  Pupils equal and reactive to light.  Neck is supple without adenopathy or carotid bruits.  No masses or thyromegaly  Heart: Regular rate and rhythm without murmurs.  Lungs: Clear to auscultation bilaterally.  Extremities: decreased range of motion of all joints.  Moderate bilateral pedal and ankle edema.   Skin: No significant or irregular nevi visualized.  Neuro: No signs of focal neurologic deficit.  No tremor.  Speech and hearing are normal.  DTRs +1/4;  Muscle Strength +5/5.  Musculoskeletal: Spine with good ROM.  No scoliosis.  Leg lengths are equal.  Psych: dull affect.  No suicidal ideation.  Mildly impaired judgement and insight since stroke.     The 10-year ASCVD risk score (Letitia MEHTA, et al., 2019) is: 18.2%    Values used to calculate the score:      Age: 79 years      Sex: Female      Is Non- : No      Diabetic: No      Tobacco smoker: No      Systolic Blood Pressure: 108 mmHg      Is BP treated: No      HDL Cholesterol: 62 mg/dL      Total Cholesterol: 212 mg/dL  I discussed face-to-face with this individual discussing their cardiovascular risk and behavioral therapies of nutritional choices, exercise and elimination of habits " contributing to risk.  We agreed on a plan how they may be able to reduce their current cardiovascular risk.  For patients with risk calculation greater than 10%, aspirin use was discussed and encouraged unless known allergy or increased risk of bleeding contraindicate use.  15 minutes.      Diagnosis/Plan  1. Health maintenance examination    2. Medicare annual wellness visit, subsequent  Living Will / Advanced Care Planning: I spent more than 15 minutes discussing advance care planning including explanation and discussion of advanced directives.  If patient does not have current up-to-date documents, examples and information were provided on how to create both living will and power of .  Patient was encouraged to work on completing these documents.        3. Vascular dementia without behavioral disturbance (Multi)  Due to dementia, patient has continual need for increased supervision and assistance with all activities of daily living.    4. Depression, unspecified depression type  - citalopram (CeleXA) 10 mg tablet; Take 1 tablet (10 mg) by mouth once daily.  Dispense: 90 tablet; Refill: 2    5. Class 2 severe obesity due to excess calories with serious comorbidity and body mass index (BMI) of 36.0 to 36.9 in adult (Multi)  Continue with dietary and exercise changes.  - Comprehensive Metabolic Panel; Future  - CBC; Future  - Lipid Panel; Future  - TSH with reflex to Free T4 if abnormal; Future  - ECG 12 Lead    6. History of stroke  - rivaroxaban (Xarelto) 20 mg tablet; Take 1 tablet (20 mg) by mouth once daily in the evening. Take with meals.  Dispense: 90 tablet; Refill: 2    7. Vitamin D deficiency  - CBC; Future  - Vitamin D 25-Hydroxy,Total (for eval of Vitamin D levels); Future    8. Urinary incontinence, unspecified type  -Continue use of undergarment briefs    9. Hyperlipidemia, unspecified hyperlipidemia type  - Lipid Panel; Future    10. Delusional thoughts (Multi)  - ARIPiprazole (Abilify) 2 mg  tablet; Take 1 tablet (2 mg) by mouth once daily.  Dispense: 90 tablet; Refill: 2    11. Malignant neoplasm of endometrium (Multi)  Resolved    12. Osteoporosis, senile  Encouraged more ambulation.  Continue dairy for calcium and vitamin D.    13. Shortness of breath  - ECG 12 Lead    14. Dysuria  - Urinalysis with Reflex Microscopic; Future          Follow up in 6 months for medical management    I will continue to monitor, evaluate, assess and treat all problems/diagnoses as appropriate and continue to collaborate with specialists.    Contact office or send a  MY Chart message with any questions or concerns    Encouraged to sign up with my  My Chart  Patient will only be notified of labs that require medical intervention.    Prescriptions will not be filled unless you are compliant with your follow up appointments or have a follow up appointment scheduled as per instruction of your physician. Refills should be requested at the time of your visit.    **Charting was completed using voice recognition technology and may include unintended errors**    Jay Fernandes DO, JORGE  [address] [facility]  [place] [address] [phone]      Jay Fernandes DO, JORGE

## 2025-01-03 ENCOUNTER — LAB (OUTPATIENT)
Dept: LAB | Facility: LAB | Age: 80
End: 2025-01-03
Payer: MEDICARE

## 2025-01-03 ENCOUNTER — APPOINTMENT (OUTPATIENT)
Dept: PRIMARY CARE | Facility: CLINIC | Age: 80
End: 2025-01-03
Payer: MEDICARE

## 2025-01-03 VITALS
SYSTOLIC BLOOD PRESSURE: 130 MMHG | BODY MASS INDEX: 33.84 KG/M2 | OXYGEN SATURATION: 97 % | HEART RATE: 102 BPM | DIASTOLIC BLOOD PRESSURE: 70 MMHG | HEIGHT: 63 IN | WEIGHT: 191 LBS

## 2025-01-03 DIAGNOSIS — Z86.73 HISTORY OF STROKE: ICD-10-CM

## 2025-01-03 DIAGNOSIS — E66.09 CLASS 1 OBESITY DUE TO EXCESS CALORIES WITH SERIOUS COMORBIDITY AND BODY MASS INDEX (BMI) OF 33.0 TO 33.9 IN ADULT: ICD-10-CM

## 2025-01-03 DIAGNOSIS — F22 DELUSIONAL THOUGHTS (MULTI): ICD-10-CM

## 2025-01-03 DIAGNOSIS — F01.50 VASCULAR DEMENTIA WITHOUT BEHAVIORAL DISTURBANCE (MULTI): Primary | ICD-10-CM

## 2025-01-03 DIAGNOSIS — E66.811 CLASS 1 OBESITY DUE TO EXCESS CALORIES WITH SERIOUS COMORBIDITY AND BODY MASS INDEX (BMI) OF 33.0 TO 33.9 IN ADULT: ICD-10-CM

## 2025-01-03 DIAGNOSIS — F41.9 ANXIETY: ICD-10-CM

## 2025-01-03 DIAGNOSIS — F32.A DEPRESSION, UNSPECIFIED DEPRESSION TYPE: ICD-10-CM

## 2025-01-03 LAB
ALBUMIN SERPL BCP-MCNC: 3.8 G/DL (ref 3.4–5)
ALP SERPL-CCNC: 76 U/L (ref 33–136)
ALT SERPL W P-5'-P-CCNC: 7 U/L (ref 7–45)
ANION GAP SERPL CALC-SCNC: 12 MMOL/L (ref 10–20)
AST SERPL W P-5'-P-CCNC: 12 U/L (ref 9–39)
BILIRUB SERPL-MCNC: 0.7 MG/DL (ref 0–1.2)
BUN SERPL-MCNC: 15 MG/DL (ref 6–23)
CALCIUM SERPL-MCNC: 9.2 MG/DL (ref 8.6–10.6)
CHLORIDE SERPL-SCNC: 108 MMOL/L (ref 98–107)
CO2 SERPL-SCNC: 27 MMOL/L (ref 21–32)
CREAT SERPL-MCNC: 0.68 MG/DL (ref 0.5–1.05)
EGFRCR SERPLBLD CKD-EPI 2021: 89 ML/MIN/1.73M*2
GLUCOSE SERPL-MCNC: 100 MG/DL (ref 74–99)
POTASSIUM SERPL-SCNC: 4.8 MMOL/L (ref 3.5–5.3)
PROT SERPL-MCNC: 6.4 G/DL (ref 6.4–8.2)
SODIUM SERPL-SCNC: 142 MMOL/L (ref 136–145)

## 2025-01-03 PROCEDURE — 1036F TOBACCO NON-USER: CPT | Performed by: FAMILY MEDICINE

## 2025-01-03 PROCEDURE — 99214 OFFICE O/P EST MOD 30 MIN: CPT | Performed by: FAMILY MEDICINE

## 2025-01-03 PROCEDURE — 80053 COMPREHEN METABOLIC PANEL: CPT

## 2025-01-03 PROCEDURE — 1159F MED LIST DOCD IN RCRD: CPT | Performed by: FAMILY MEDICINE

## 2025-01-03 PROCEDURE — 1157F ADVNC CARE PLAN IN RCRD: CPT | Performed by: FAMILY MEDICINE

## 2025-01-03 PROCEDURE — 1160F RVW MEDS BY RX/DR IN RCRD: CPT | Performed by: FAMILY MEDICINE

## 2025-01-03 RX ORDER — CITALOPRAM 10 MG/1
10 TABLET ORAL DAILY
Qty: 90 TABLET | Refills: 2 | Status: SHIPPED | OUTPATIENT
Start: 2025-01-03

## 2025-01-03 RX ORDER — ARIPIPRAZOLE 2 MG/1
2 TABLET ORAL DAILY
Qty: 90 TABLET | Refills: 2 | Status: SHIPPED | OUTPATIENT
Start: 2025-01-03

## 2025-01-03 NOTE — PROGRESS NOTES
General Medical Management Note    79 y.o. female presents for Medical Management.  Brought to office and assisted during exam by her daughter, Kavitha.    HPI  Obesity: Patient has had a 30 pound weight loss since December 2023.  Not eating breakfast, no snacking.  Denies diarrhea, nausea, vomiting    Vascular dementia with delusions.  Abilify has been effective for minimizing delusions.  Incontinent of urine requiring use of depends briefs.  Walks with walker.  Able to feed herself.  Has a good vocabulary.  Continues to require the assistance of others for hygiene, medication administration (set up), laundry, meal preparation and transportation.    Long-term anticoagulation with Xarelto due to history of stroke.    DJD right hip and knee.  Pain with overuse.  Better with rest and tylenol but feels that tylenol causes sedation.  Also has some restless leg symptoms at night.        Past Medical History:   Diagnosis Date    Acute embolism and thrombosis of other specified veins     Blood clot in abdominal vein    HTN (hypertension) 06/15/2023    Nontraumatic intracerebral hemorrhage, unspecified (Multi) 07/02/2024    Hemorrhagic cerebrovascular accident (CVA)    Personal history of other venous thrombosis and embolism 07/02/2024    History of deep vein thrombosis (DVT) of lower extremity    Personal history of pulmonary embolism 02/22/2021    History of pulmonary embolism      Past Surgical History:   Procedure Laterality Date    CT ANGIO NECK  8/9/2016    CT NECK ANGIO W AND WO IV CONTRAST 8/9/2016 [facility] INPATIENT LEGACY    CT HEAD ANGIO W AND WO IV CONTRAST  8/9/2016    CT HEAD ANGIO W AND WO IV CONTRAST 8/9/2016 [facility] INPATIENT LEGACY    OTHER SURGICAL HISTORY  02/22/2021    Total hysterectomy with removal of both tubes and ovaries    OTHER SURGICAL HISTORY  08/27/2019    Cyst excision    OTHER SURGICAL HISTORY  08/28/2019    Cyst excision     Family History   Problem Relation Name Age of Onset    Aneurysm Mother       "Other (malignant neoplasm of stomach [Other]) Father      Other (malignant neoplasm of breast) Sister        Social History     Socioeconomic History    Marital status: Single     Spouse name: Not on file    Number of children: Not on file    Years of education: Not on file    Highest education level: Not on file   Occupational History    Not on file   Tobacco Use    Smoking status: Never    Smokeless tobacco: Never   Substance and Sexual Activity    Alcohol use: Not Currently    Drug use: Not Currently    Sexual activity: Not on file   Other Topics Concern    Not on file   Social History Narrative    Not on file     Social Drivers of Health     Financial Resource Strain: Not on file   Food Insecurity: Not on file   Transportation Needs: Not on file   Physical Activity: Not on file   Stress: Not on file   Social Connections: Not on file   Intimate Partner Violence: Not on file   Housing Stability: Not on file       Current Outpatient Medications on File Prior to Visit   Medication Sig Dispense Refill    acetaminophen (Tylenol) 500 mg tablet Take by mouth.      ARIPiprazole (Abilify) 2 mg tablet Take 1 tablet (2 mg) by mouth once daily. 90 tablet 2    cholecalciferol (Vitamin D-3) 5,000 Units tablet Take 1 tablet (5,000 Units) by mouth once daily.      citalopram (CeleXA) 10 mg tablet Take 1 tablet (10 mg) by mouth once daily. 90 tablet 2    psyllium (Metamucil) 3.4 gram packet Take by mouth.      rivaroxaban (Xarelto) 20 mg tablet Take 1 tablet (20 mg) by mouth once daily in the evening. Take with meals. 90 tablet 2     No current facility-administered medications on file prior to visit.       Allergies   Allergen Reactions    Oxycodone Unknown    Propoxyphene Hallucinations    Pseudoephedrine Hallucinations    Saccharin Unknown         ROS: Denies chest pain, SOB, Headache, GI problems     Visit Vitals  /70   Pulse 102   Ht 1.6 m (5' 3\")   Wt 86.6 kg (191 lb)   SpO2 97%   BMI 33.83 kg/m²   Smoking Status " Never   BSA 1.96 m²        PHYSICAL EXAM:  Alert and oriented to self and location.  Eyes: EOM grossly intact  Neck supple without lymph adenopathy or carotid bruit.  No masses or thyromegaly  Heart regular rate and rhythm without murmur.  Lungs clear to auscultation.  Legs without edema.  Gait is antalgic, slow, guarded with a walker  Speech clear.  Hearing adequate.      DIAGNOSIS/PLAN:  1.  Vascular dementia with delusions  - Continued requirement for assisted living facility due to ADL needs related to dementia.    2. Delusional thoughts (Multi)  - ARIPiprazole (Abilify) 2 mg tablet; Take 1 tablet (2 mg) by mouth once daily.  Dispense: 90 tablet; Refill: 2    3. Depression, unspecified depression type  - citalopram (CeleXA) 10 mg tablet; Take 1 tablet (10 mg) by mouth once daily.  Dispense: 90 tablet; Refill: 2    4. History of stroke  - rivaroxaban (Xarelto) 20 mg tablet; Take 1 tablet (20 mg) by mouth once daily in the evening. Take with meals.  Dispense: 90 tablet; Refill: 2    -If Xarelto expensive becomes too great, consider referral to clinical pharmacy or switch to Plavix    5. Anxiety (Primary)    6. Class 1 obesity due to excess calories with serious comorbidity and body mass index (BMI) of 33.0 to 33.9 in adult  Is successfully losing weight with dietary changes.  Will continue to monitor      Return to office in 6 months for comprehensive medical evaluation, long-term medication use monitoring, and preventative services screening    We will continue to monitor, evaluate, assess and treat all problems/diagnoses as appropriate and continue to collaborate with specialists.    [facility]    Contact office or send a  [employer] message with any questions or concerns    Patient will only be notified of labs that require medical intervention.    Prescriptions will not be filled unless you are compliant with your follow up appointments or have a follow up appointment scheduled as per  instruction of your physician. Refills should be requested at the time of your visit.    **Charting was completed using voice recognition technology and may include unintended errors**    Jay Fernandes DO, JORGE  [address] [facility]  [place] [address] [phone]    Jay Fernandes DO, JORGE

## 2025-02-27 ENCOUNTER — OFFICE VISIT (OUTPATIENT)
Dept: PRIMARY CARE | Facility: CLINIC | Age: 80
End: 2025-02-27
Payer: MEDICARE

## 2025-02-27 ENCOUNTER — HOSPITAL ENCOUNTER (OUTPATIENT)
Dept: RADIOLOGY | Facility: CLINIC | Age: 80
Discharge: HOME | End: 2025-02-27
Payer: MEDICARE

## 2025-02-27 VITALS
OXYGEN SATURATION: 95 % | WEIGHT: 192 LBS | DIASTOLIC BLOOD PRESSURE: 78 MMHG | HEART RATE: 92 BPM | SYSTOLIC BLOOD PRESSURE: 118 MMHG | TEMPERATURE: 97.3 F | BODY MASS INDEX: 34.01 KG/M2 | RESPIRATION RATE: 18 BRPM

## 2025-02-27 DIAGNOSIS — R06.89 LOUD BREATHING DURING SLEEP: ICD-10-CM

## 2025-02-27 DIAGNOSIS — R06.89 LOUD BREATHING DURING SLEEP: Primary | ICD-10-CM

## 2025-02-27 PROCEDURE — 1159F MED LIST DOCD IN RCRD: CPT

## 2025-02-27 PROCEDURE — 99213 OFFICE O/P EST LOW 20 MIN: CPT

## 2025-02-27 PROCEDURE — 1157F ADVNC CARE PLAN IN RCRD: CPT

## 2025-02-27 PROCEDURE — G2211 COMPLEX E/M VISIT ADD ON: HCPCS

## 2025-02-27 PROCEDURE — 71046 X-RAY EXAM CHEST 2 VIEWS: CPT

## 2025-02-27 NOTE — PROGRESS NOTES
"Subjective   Patient ID: Amy Nava is a 80 y.o. female who presents for Cough (PCP Dom.  Cough for some time. She is calling it the \"death rattle\").    Patient here today for wheezing breathing and cough  Denies any dyspnea, shortness of breath, or general malaise/other sick symptoms.. Has felt some increasing weakness  No previous history of wheezing, COPD, no smoking history  No recent illness or known specific sick contacts.         Review of Systems    Objective   /78 (BP Location: Right arm, Patient Position: Sitting)   Pulse 92   Temp 36.3 °C (97.3 °F)   Resp 18   Wt 87.1 kg (192 lb)   SpO2 95%   BMI 34.01 kg/m²     Physical Exam  Constitutional:       General: She is not in acute distress.     Appearance: Normal appearance. She is not ill-appearing.   Eyes:      General: No scleral icterus.  Cardiovascular:      Rate and Rhythm: Normal rate and regular rhythm.      Heart sounds: No murmur heard.     No friction rub. No gallop.   Pulmonary:      Effort: Pulmonary effort is normal. No respiratory distress.      Breath sounds: Normal breath sounds. No wheezing, rhonchi or rales.   Musculoskeletal:      Right lower leg: No edema.      Left lower leg: No edema.   Neurological:      General: No focal deficit present.      Mental Status: She is alert and oriented to person, place, and time.   Psychiatric:         Mood and Affect: Mood normal.         Behavior: Behavior normal.         Assessment/Plan   Problem List Items Addressed This Visit    None  Visit Diagnoses         Codes    Loud breathing during sleep    -  Primary R06.89    Relevant Orders    XR chest 2 views (Completed)        No significant discernible or otherwise concerning findings on physical exam.  Patient overall is well-appearing.  Nonetheless given self-reported wheezing will obtain chest x-ray to rule out any interstitial/walking pneumonia.  Contact office should she have any acute worsening of symptoms, development of fever or " shortness of breath.  Follow-up as needed or 2 weeks if symptoms fail to improve.    Cong Patel, DO

## 2025-04-29 ENCOUNTER — TELEPHONE (OUTPATIENT)
Dept: PRIMARY CARE | Facility: CLINIC | Age: 80
End: 2025-04-29
Payer: MEDICARE

## 2025-04-29 DIAGNOSIS — L30.4 INTERTRIGO: Primary | ICD-10-CM

## 2025-04-29 RX ORDER — NYSTATIN 100000 U/G
CREAM TOPICAL
Qty: 30 G | Refills: 5 | Status: SHIPPED | OUTPATIENT
Start: 2025-04-29

## 2025-04-29 NOTE — TELEPHONE ENCOUNTER
Criselda from [facility] was calling about Amy that she has abdominal folds and would like for you to prescribe nystatin cream and send it to [facility] , thank you.

## 2025-05-23 ENCOUNTER — APPOINTMENT (OUTPATIENT)
Dept: PRIMARY CARE | Facility: CLINIC | Age: 80
End: 2025-05-23
Payer: MEDICARE

## 2025-05-23 VITALS
HEIGHT: 63 IN | SYSTOLIC BLOOD PRESSURE: 137 MMHG | DIASTOLIC BLOOD PRESSURE: 83 MMHG | WEIGHT: 189 LBS | HEART RATE: 99 BPM | BODY MASS INDEX: 33.49 KG/M2 | OXYGEN SATURATION: 96 %

## 2025-05-23 DIAGNOSIS — E66.811 CLASS 1 OBESITY DUE TO EXCESS CALORIES WITH SERIOUS COMORBIDITY AND BODY MASS INDEX (BMI) OF 33.0 TO 33.9 IN ADULT: ICD-10-CM

## 2025-05-23 DIAGNOSIS — F01.50 VASCULAR DEMENTIA WITHOUT BEHAVIORAL DISTURBANCE (MULTI): Primary | ICD-10-CM

## 2025-05-23 DIAGNOSIS — F32.A DEPRESSION, UNSPECIFIED DEPRESSION TYPE: ICD-10-CM

## 2025-05-23 DIAGNOSIS — Z86.73 HISTORY OF STROKE: ICD-10-CM

## 2025-05-23 DIAGNOSIS — Z85.42 HISTORY OF MALIGNANT NEOPLASM OF ENDOMETRIUM: ICD-10-CM

## 2025-05-23 DIAGNOSIS — E66.09 CLASS 1 OBESITY DUE TO EXCESS CALORIES WITH SERIOUS COMORBIDITY AND BODY MASS INDEX (BMI) OF 33.0 TO 33.9 IN ADULT: ICD-10-CM

## 2025-05-23 DIAGNOSIS — R26.2 AMBULATORY DYSFUNCTION: ICD-10-CM

## 2025-05-23 DIAGNOSIS — F22 DELUSIONAL THOUGHTS (MULTI): ICD-10-CM

## 2025-05-23 DIAGNOSIS — F41.9 ANXIETY: ICD-10-CM

## 2025-05-23 PROBLEM — E66.01 CLASS 2 SEVERE OBESITY DUE TO EXCESS CALORIES WITH SERIOUS COMORBIDITY AND BODY MASS INDEX (BMI) OF 38.0 TO 38.9 IN ADULT: Status: ACTIVE | Noted: 2025-05-23

## 2025-05-23 PROBLEM — E66.812 CLASS 2 SEVERE OBESITY DUE TO EXCESS CALORIES WITH SERIOUS COMORBIDITY AND BODY MASS INDEX (BMI) OF 38.0 TO 38.9 IN ADULT: Status: ACTIVE | Noted: 2025-05-23

## 2025-05-23 PROBLEM — C54.1 MALIGNANT NEOPLASM OF ENDOMETRIUM (MULTI): Status: RESOLVED | Noted: 2024-07-02 | Resolved: 2025-05-23

## 2025-05-23 PROCEDURE — 1160F RVW MEDS BY RX/DR IN RCRD: CPT | Performed by: FAMILY MEDICINE

## 2025-05-23 PROCEDURE — 1036F TOBACCO NON-USER: CPT | Performed by: FAMILY MEDICINE

## 2025-05-23 PROCEDURE — 99215 OFFICE O/P EST HI 40 MIN: CPT | Performed by: FAMILY MEDICINE

## 2025-05-23 PROCEDURE — G2211 COMPLEX E/M VISIT ADD ON: HCPCS | Performed by: FAMILY MEDICINE

## 2025-05-23 PROCEDURE — 1159F MED LIST DOCD IN RCRD: CPT | Performed by: FAMILY MEDICINE

## 2025-05-23 NOTE — PROGRESS NOTES
"General Medical Management Note    80 y.o. female presents for Medical Management  Accompanied by her daughter who provides additional history.    HPI  Moving to [facility] in [place].  June 1, 2025.  Currently no acute illness  Ambulating with walker/rollator  ADLs:  able to feed self despite intention tremor;  total care for hygiene except toileting. Needs assistance with putting on blouses due to right arm limited ROM.    Medications administered by ALU staff.        Medical History[1]   Surgical History[2]  Family History[3]   Social History     Socioeconomic History    Marital status: Single     Spouse name: Not on file    Number of children: Not on file    Years of education: Not on file    Highest education level: Not on file   Occupational History    Not on file   Tobacco Use    Smoking status: Never    Smokeless tobacco: Never   Substance and Sexual Activity    Alcohol use: Not Currently    Drug use: Not Currently    Sexual activity: Not on file   Other Topics Concern    Not on file   Social History Narrative    Not on file     Social Drivers of Health     Financial Resource Strain: Not on file   Food Insecurity: Not on file   Transportation Needs: Not on file   Physical Activity: Not on file   Stress: Not on file   Social Connections: Not on file   Intimate Partner Violence: Not on file   Housing Stability: Not on file       Medications Ordered Prior to Encounter[4]    Allergies[5]      ROS: Denies chest pain, SOB, Headache, GI problems     Visit Vitals  /83   Pulse 99   Ht 1.6 m (5' 3\")   Wt 85.7 kg (189 lb)   SpO2 96%   BMI 33.48 kg/m²   Smoking Status Never   BSA 1.95 m²      Vitals:    05/23/25 0846   BP: 137/83   Pulse: 99   SpO2: 96%   Weight: 85.7 kg (189 lb)   Height: 1.6 m (5' 3\")       PHYSICAL EXAM:  Alert and oriented to self and location.  She knows that she is with her daughter and remembers my name and that I am her physician.  Face is predominantly expressionless.  Eyes: EOM " grossly intact  Neck supple without lymph adenopathy or carotid bruit.  No masses or thyromegaly  Heart regular rate and rhythm without murmur.  Lungs clear to auscultation.  Legs with mild venous insufficiency edema.  No tremors noted in upper extremities.  Very limited range of motion of bilateral shoulders.  Gait is guarded with rollator.  She is able to stand from a seated position with use of her arms.  Speech clear.  Hearing adequate.      Chest x-ray in February 2025 showed no active disease.  Lab testing in January 2025 showed normal renal function and liver function without evidence of diabetes  ECG in July 2024 indicated normal sinus rhythm    DIAGNOSIS/PLAN:  Problem List Items Addressed This Visit       Anxiety    Vascular dementia without behavioral disturbance (Multi) - Primary    Depression    History of stroke    Class 1 obesity due to excess calories with serious comorbidity and body mass index (BMI) of 33.0 to 33.9 in adult    Malignant neoplasm of endometrium (Multi)    Ambulatory dysfunction     Other Visit Diagnoses         Delusional thoughts (Multi)              Will complete form for [facility] of licensing and regulatory affairs, [facility] and [employer] systems.      Follow up in office when needed at any time.  Patient will have new physician at her new facility in [place].    I will continue to monitor, evaluate, assess and treat all problems/diagnoses as appropriate and continue to collaborate with specialists.    Contact office or send a  MY Chart message with any questions or concerns    Encouraged to sign up with my  My Chart  Patient will only be notified of labs that require medical intervention.    Prescriptions will not be filled unless you are compliant with your follow up appointments or have a follow up appointment scheduled as per instruction of your physician. Refills should be requested at the time of your visit.    **Charting was completed using voice  recognition technology and may include unintended errors**    Jay Fernandes DO, YAZANP  [address] [id number]  [place] [address]  [phone]    Jay Fernandes DO, FACOFP             [1]   Past Medical History:  Diagnosis Date    Acute embolism and thrombosis of other specified veins     Blood clot in abdominal vein    HTN (hypertension) 06/15/2023    Nontraumatic intracerebral hemorrhage, unspecified (Multi) 07/02/2024    Hemorrhagic cerebrovascular accident (CVA)    Personal history of other venous thrombosis and embolism 07/02/2024    History of deep vein thrombosis (DVT) of lower extremity    Personal history of pulmonary embolism 02/22/2021    History of pulmonary embolism   [2]   Past Surgical History:  Procedure Laterality Date    CT ANGIO NECK  8/9/2016    CT NECK ANGIO W AND WO IV CONTRAST 8/9/2016 [facility] INPATIENT [facility]GAC[facility]    CT HEAD ANGIO W AND WO IV CONTRAST  8/9/2016    CT HEAD ANGIO W AND WO IV CONTRAST 8/9/2016 [facility] INPATIENT LEGACY    OTHER SURGICAL HISTORY  02/22/2021    Total hysterectomy with removal of both tubes and ovaries    OTHER SURGICAL HISTORY  08/27/2019    Cyst excision    OTHER SURGICAL HISTORY  08/28/2019    Cyst excision   [3]   Family History  Problem Relation Name Age of Onset    Aneurysm Mother      Other (malignant neoplasm of stomach [Other]) Father      Other (malignant neoplasm of breast) Sister     [4]   Current Outpatient Medications on File Prior to Visit   Medication Sig Dispense Refill    acetaminophen (Tylenol) 500 mg tablet Take by mouth.      ARIPiprazole (Abilify) 2 mg tablet Take 1 tablet (2 mg) by mouth once daily. 90 tablet 2    cholecalciferol (Vitamin D-3) 5,000 Units tablet Take 1 tablet (125 mcg) by mouth once daily.      citalopram (CeleXA) 10 mg tablet Take 1 tablet (10 mg) by mouth once daily. 90 tablet 2    nystatin (Mycostatin) cream Apply 1/2 g twice daily to abdominal skin folds until rash resolves. 30 g 5    psyllium (Metamucil) 3.4 gram packet Take by mouth.       rivaroxaban (Xarelto) 20 mg tablet Take 1 tablet (20 mg) by mouth once daily in the evening. Take with meals. 90 tablet 2     No current facility-administered medications on file prior to visit.   [5]   Allergies  Allergen Reactions    Oxycodone Unknown    Propoxyphene Hallucinations    Pseudoephedrine Hallucinations    Saccharin Unknown

## 2025-05-23 NOTE — PROGRESS NOTES
"Subjective     Patient ID: [id number]     Amy Nava is a 80 y.o. female who presents for ALU health assessment.    HPI    Objective   /83   Pulse 99   Ht 1.6 m (5' 3\")   Wt 85.7 kg (189 lb)   SpO2 96%   BMI 33.48 kg/m²    Physical Exam: ***    Assessment/Plan   Problem List Items Addressed This Visit    None      Jay Fernandes DO   "

## 2025-06-06 ENCOUNTER — TELEPHONE (OUTPATIENT)
Dept: PRIMARY CARE | Facility: CLINIC | Age: 80
End: 2025-06-06
Payer: MEDICARE

## 2025-06-06 DIAGNOSIS — E66.811 CLASS 1 OBESITY DUE TO EXCESS CALORIES WITH SERIOUS COMORBIDITY AND BODY MASS INDEX (BMI) OF 33.0 TO 33.9 IN ADULT: ICD-10-CM

## 2025-06-06 DIAGNOSIS — E66.09 CLASS 1 OBESITY DUE TO EXCESS CALORIES WITH SERIOUS COMORBIDITY AND BODY MASS INDEX (BMI) OF 33.0 TO 33.9 IN ADULT: ICD-10-CM

## 2025-06-06 DIAGNOSIS — Z86.73 HISTORY OF STROKE: ICD-10-CM

## 2025-06-06 NOTE — TELEPHONE ENCOUNTER
"Rx Refill Request Telephone Encounter    Patient needs 10 pills since she is moving and misplaced it. Daughter bought it med already so she will try to find it.     Name:  Amy Nava  :  [id number]  Medication Name:  rivaroxaban (Xarelto)  Dose: 20 mg   Route: oral   Frequency: Daily with evening meal  Dispense Quantity: 90 tablet (90 day supply) Refills: 2   Duration: --   Dispense As Written: No        Sig: Take 1 tablet (20 mg) by mouth once daily in the evening. Take with meals.    Last refill\" 25    Specific Pharmacy location:  : [facility] [address] [place] [address]  Date of last appointment:  25  Date of next appointment:  25  Best number to reach patient:  [phone]            "

## 2025-07-31 ENCOUNTER — APPOINTMENT (OUTPATIENT)
Dept: PRIMARY CARE | Facility: CLINIC | Age: 80
End: 2025-07-31
Payer: MEDICARE